# Patient Record
(demographics unavailable — no encounter records)

---

## 2025-04-23 NOTE — WORK
[Other: ___] : [unfilled] [Was the competent medical cause of the injury] : was the competent medical cause of the injury [Consistent with my objective findings] : consistent with my objective findings [Total (100%)] : total (100%) [Cannot return to work because ________] : cannot return to work because [unfilled] [No Rx restrictions] : No Rx restrictions. [I provided the services listed above] :  I provided the services listed above.

## 2025-04-24 NOTE — PHYSICAL EXAM
[Bilateral] : shoulder bilaterally [Sitting] : sitting [Mild] : mild [5 ___] : forward flexion 5[unfilled]/5 [5___] : external rotation 5[unfilled]/5 [] : motor and sensory intact distally [TWNoteComboBox4] : passive forward flexion 160 degrees [TWNoteComboBox6] : internal rotation L1

## 2025-04-24 NOTE — DISCUSSION/SUMMARY
[Medication Risks Reviewed] : Medication risks reviewed [de-identified] : cervical radiculopathy and bilateral shoulder impingement fibromyalgia has found relief in the past with PT - will continue this MRI C-spine indicated not working f/u 6 weeks

## 2025-04-24 NOTE — HISTORY OF PRESENT ILLNESS
[Neck] : neck [6] : 6 [Localized] : localized [Tightness] : tightness [Intermittent] : intermittent [Nothing helps with pain getting better] : Nothing helps with pain getting better [de-identified] : WC DOI 9/13/2013  Prior notes 6/3/15: here as a new consult from Dr. Govea - per his notes  " 51yo [employer] F reports R shoulder pain x 2 yrs. 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 Had PT ordered by pain management (Dr. Schmid) with some relief initially but now recurred. Also reports neck pain for the same duration also treated with PT. Neck stable at this point. INteremittent hand numbness based on sleeping position.  10/2/2013 MRI C-spine ([facility]): C2-3, 3-4. 4-5 herniation, no cord signal abnormality  4/1/15 MRI R shoulder ([facility]): Undersurface spurring acromion with subacromial bursitis, Moderate tendinosis SS, IS and bursal sided fraying.   4/6/15: Shoulder improving with PT. Presents to review MRI.  5/21/15: Several questions today. Concerned with hand, axilla, bilat shoulders. Thought she had breast cancer so had eval for lymphadenopathy of bilat axilla recently. She tells me this may become a workers comp case but she is still working this out with her .  Here today as consult with pain in the right posterior shoulder/neck area - left arm diffusely week - was given a prescription for Mobic which she cant take as it spikes her blood pressure - left arm/shoulder weak as well and painful  Tried 2 months of PT for her neck - not anymore due to lack of insurance  HTN - no hx of cancer   Works as a  - been out of work for over 1 year at this point - right handed - no loss of fine motor - no loss of b/b control.  Unable to get the C spine MRI disc to open today in the office  6/17/15: here for f/u - plan at last visit was "would like new MRI as she has per report right sided NF stenosis at C5-6 which could correlate to her symptoms - will add Neurontin and have f/u in 3 weeks - left shoulder symptoms as well will get left shoulder MRI - cont with PT" - overall doing the same - been going to PT - helps a bit but not able to go much - tried heating which was helpful - at times down the arms right arm is worse than the left - moves around - didn't take the gabapentin - now with pain in the left hand.   MRI L shoulder - mild distal SS tendinosis with mild undersurgace fraying of tendon fibers no high grade or full thinckness. Type 3 slap tear  MRI C spine - Multilvl degen changes - most pronnced from C3-4 through C5-6 - worse at C5-6 with bilateral NF stenosis moderate to severe  7/15/15: here for f/u neck/shoulder - plan last was "Cont with PT at this point - and transition over to HEP" - overall doing reasonably well with the neck and the left shoulder - the right shoulder bothers her though - doing HEP and getting massage at home - motrin helping currently - didn't get gabapentin filled (it didn't go through)  10/14/15: here for f/u - plan at last was "Cont with PT if possible - if not then would cont with HEP - we discussed shoulder injectin shes not interested at this point - will add gabapentin paper script" - overall at this point the neck pain is better but not the shoulder - PT was only approved twice at this point - she was approved for workers comp -  11/25/15: Here for f/u - plan at last was "Suspect that she could work in a very flexible capacity - though do not know if she could rigid sustained she would need to have control over the schedule in order to rest according to the level of pain that she was experiencing - will add Lidoderm patches as well - do thinks that PT would be beneficial in terms of reducing pain, increasing functional capacity and possibly in a work hardening capacity - would like to see PT approved at this point and centered around the right shoulder girdle and proximal arm - we did discuss possibly of referral to pain management for MELI" - overall doing poorly - signficnt pain in the right neck/shoulder/trapezius  Has lumps in the right forearm  1/13/16: Here for f/u - plan at last was "would refer over to pain for MELI if that fails then facet block versus ACDF c5-6 as other options - think she would benefit from PT at this point in terms decreasing pain in increasing functional capacity - would order MRI of the right forearm likely lipoma"   MRI forearm - Skin marker is evident along the ulnar aspect proximal one third of the forearm. Skin marker is also evident along the ulnar aspect of the wrist without abnormal signal deep to the skin marker around the skin marker to suggest pathology. There is no abnormal signal evident deep to or around the skin marker. There is no abnormal signal evident throughout the osseous structures of the imaged forearm to suggest fracture or osteonecrosis. The visualized musculature of the imaged form appears grossly normal in signal and morphology. Incidental note is made of high signal foci within the ulnar slightly volar mid one third subcutaneous tissue of the forearm suggesting venous varicosities. If pain is localized to these regions then correlation with ultrasound is suggested as clinically warranted. The musculature of the imaged forearm appears grossly normal in signal and morphology. The fat planes surrounding the visualized neurovascular structures are preserved  has seen a dermatologist for the forearm  PT has been approved and also the MELI - no MELI  2/10/16: Follows up today. Plan at last visit was -"would refer over to pain for MELI if that fails then facet block versus ACDF c5-6 as other options - think she would benefit from PT at this point in terms decreasing pain in increasing functional capacity - the right arm lesion is being followed by dermatology currently - new PT script - Tylenol as needed - working in very limited capacity currently." Overall doing about the same. States that PT has been helping and taking Tylenol prn. She has stopped working. She did not proceed with epidural since she would like to continue with PT and Tylenol and utilize MELI if pain worsens. She has been noticing pain throughout her body toward the end of the day.  3/9/16: Follows up today. Plan at last visit was "would refer over to pain for MELI if that fails then facet block versus ACDF c5-6 as other options - think she would benefit from PT at this point in terms decreasing pain in increasing functional capacity - the right arm lesion is being followed by dermatology currently - new PT script - Tylenol as needed - OOW and applying for disability at this time. We also recommend going for labs - CBC with diff, HLA-B27, RA, ESR, CRP. Follow up 4 weeks." Overall doing alittle bit better. States that she has been doing PT/HEP. Since last visit she has been having a sharp upper quadrant pain which she has been following with her PCP who believes it is a muscle spasm. She presents today with her lab results requested at last visit. States that she has not been to dermatology to evaluate lipoma on right arm. Currently OOW.  Results: unremarkable  4/20/16: here for fu - plan at last was "Continue PT/HEP - Continue to consider MELI if pain gets unbearable - Consult with dermatology for lipoma evaluation - Continue OOW" - overall doing about the same - still significant pain in the neck and in the bilateral shoulders and down the right arm - not using pain killer at this point - using heating pad as well  6/1/16: Here for f/u - plan at last was "Continue PT/HEP - Continue to consider MELI if pain gets unbearable - Continue OOW - fu 4 weeks" - out of work at this point - pain in the neck and the right shoulder - using motrin at times  7/13/16: Here for f/u - plan at last was "Cont with HEP - motrin is helpful - Lidoderm patches - discussed referral to pain for cervical injectin but shes not ready for that at this point" - HEP/PT is helpful for her - overall doing about the same - still with significant pain in the right trap/shoulder - using motrin at this point - continued out of work at this point  9/7/16: Follows up today. Plan at last visit was -"Cont with HEP - will cont with PT - motrin is helpful - Lidoderm patches - discussed referral to pain for cervical injectin but shes not ready for that at this point." Overall doing about the same if not worse. States that she has not had PT x 20 days since they did not approve it. She is thinking about using her private insurance at this point. Continues oow.  10/19/16: f/u today, plant last visit was -"Cont with HEP - resubmit auth for PT which has been deemed medically necessary at this point for performing activities of daily living- motrin is helpful - Lidoderm patches - discussed referral to pain for cervical injection and she feels ready for that at this point - follow up 4 - 6 weeks - at future visit may consider right shoulder injection." She is overall doing about the same, she bought a massage chair and has been using hot compresses with relief. The Lidoderm patches are very helpful. PT is still not approved. She is not interested in a MELI or right shoulder injection at this point but will consider it next visit.  12/14/2016: Plan last visit "Cont with HEP - resubmit auth for PT which has been deemed medically necessary at this point for performing activities of daily living Lidoderm patches - up 4 - 6 weeks - at future visit may consider right shoulder injection." Sx unchanged. PT was not approved.  1/25/17: HERe for f/u - plan at last was "R subacromial bursa injected with 1 cc kenalog 10 2 cc Marcaine 2 cc lido under sterile technique - PT requested, again" - no help from the injection in the right shoulder - has been using meds/hot towel - posterior/neck shoulder is very tight - PT not approved via  - the Lidoderm patches are also not covered   xrays today: C spine 4 views - loss of disc height at 5-6 and anterior calcification at C6-7 - slight retrolisthesis of C5 on C6 T spine 2 views - spondylosis noted R shoulder 3 views - no fractures  2/22/17: Here for f/u - plan at last visit "Would obtain updated MRI of the C spine and the R shoulder - referral to pain for MELI - referral to rheumatology for further eval of polyarthralgia - cont with Lidoderm patches - not able to return to work due to continued pain - would refer back to MELI - if that fails would be a candidate for surgery". States she is having worsening right neck/shoulder pain. Has not seen pain management. Has not rheumatologist. Lidoderm/PT/heat helps.  no longer authorizing PT. Has been doing massage therapy with relief. Shoulder injections haven't been helpful in the past - most of the pain in the back of the neck - using Lidoderm patches   MRI cspine: 1. Straightening with reversal of cervical lordosis. 2. Posterior central disc herniations at C2/3, C3/4, C4/5 and C5/6, with superior disc extrusion at C3/4. 3. Annular tear at C4/5. 4. Broad-based left paracentral/left foraminal disc herniation, arthrosis, central canal stenosis and bilateral neuroforaminal narrowing, left more than right, at C5/6.  MRI right shoulder: 1. Severe supraspinatus tendinosis with prominent bursal-sided frayed partial tear and bursal-sided linear oblique partial tear extending to its enthesis without full-thickness, retracted tear. 2. Prominent subdeltoid bursitis. 3. Anatomic findings suggesting subacromial impingement in the correct clinical setting.  5/17/17: Here for f/u - plan at last was "Do not think she is at [facility] would want her to - referral to pain for MELI - after that would consider her at [facility] - referral to rheumatology for further eval of polyarthralgia - cont with Lidoderm patches - not able to return to work due to continued pain -would refer back to MELI - if that fails would be a candidate for surgery - referral to shoulder surgeon for further eval of the shoulder and for permanency evaluation of the shoulder and possible other treatment options - more massage therapy and refilled the Lidoderm patches" - overall doing worse - pain in the neck and into the right shoulder - PT not approved  Has seen Dr Govea and has [facility] done for the shoulder   Saw rheumtatology and was diagnosed with fibromyalgia  Has done PT and medications  Notes that the pain in the shoulder limits her activity to only a few hours of activity a month   right handed   Can drive short distances - lives with family - has stairs at home and can climb the stairs - able to bathe/dress herself - can do simple household chores slowly   Pain worse in the shoulder than in the neck - the pain goes at time all the way down the arm  6/28/17: Here for f/u - plan at last "NSL-PPD done for the c spine today - see notes for details - fu 6 weeks - lidoerm patches refilled" Overall doing worse - pain in the neck and into the right shoulder - PT not approved so has been doing it under her own insurance which has been improving her symptoms - has not seen pain management, no ESIs - OOW - has not been following rheumatologist  8/9/17: here for f/u - plan at last was "Will again refer to pain management for MELI - f/u with Rheumatologist for fibromyalgia management - cont with Lidoderm patches - not able to return to work due to continued pain - if that CESIs fails would be a candidate for surgery - cont. PT as this has been helpful - has Lidoderm patches at home - fu 6 weeks". Overall doing about the same. Scheudled for MBB with Dr. Diaz Aug 23rd. OOW. L shoulder more painful for her - doing PT which is helpful - limited ROM  9/20/17: F/u today - plan from last visit was "Cervical MBB scheduled 8/23 with Dr. Diaz - f/u with Rheumatologist for fibromyalgia management - cont with Lidoderm patches - not able to return to work due to continued pain - if that CESIs fails would be a candidate for surgery - cont. PT as this has been helpful - has Lidoderm patches at home - fu 6 weeks" - overall symptoms continue - had cervical MBB with Dr. Diaz without relief - Dr. Diaz requested MELI - she has been doing PT - has been doing light duty, but states have difficulty lifting even up to 10 lbs and cant lift the 10 pound s ever under any circumstances  --------------------------------------------------------------------------------------------------------------------  4/23/2025: Michelle is a 60 year old female presenting with neck pain that goes into bilateral shoulders.  There is tightness to the neck radiating into the upper back with burning.  No radiating arm pain or tingling. No loss of fine motor motions. Bilateral anterior shoulder pain. Has been seeing Dr Honeycutt and has been attending PT - Has not had PT since 8/2024 but has been doing home exercises Has not taken any medications for pain  No recent injections no working  xrays today C-spine 4v - cervical spondylosis  [] : no [FreeTextEntry1] : [place] shoulders  [FreeTextEntry3] :  09/03/13 [FreeTextEntry7] : Bilateral shoulders [de-identified] : Dr. Honeycutt [de-identified] : 04/16/2024 [de-identified] : XRAY of cspine from 4/2024

## 2025-04-24 NOTE — DISCUSSION/SUMMARY
[Medication Risks Reviewed] : Medication risks reviewed [de-identified] : cervical radiculopathy and bilateral shoulder impingement fibromyalgia has found relief in the past with PT - will continue this MRI C-spine indicated not working f/u 6 weeks

## 2025-04-24 NOTE — HISTORY OF PRESENT ILLNESS
[Neck] : neck [6] : 6 [Localized] : localized [Tightness] : tightness [Intermittent] : intermittent [Nothing helps with pain getting better] : Nothing helps with pain getting better [de-identified] : WC DOI 9/13/2013  Prior notes 6/3/15: here as a new consult from Dr. Govea - per his notes  " 51yo [employer] F reports R shoulder pain x 2 yrs. 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 Had PT ordered by pain management (Dr. Schmid) with some relief initially but now recurred. Also reports neck pain for the same duration also treated with PT. Neck stable at this point. INteremittent hand numbness based on sleeping position.  10/2/2013 MRI C-spine ([facility]): C2-3, 3-4. 4-5 herniation, no cord signal abnormality  4/1/15 MRI R shoulder ([facility]): Undersurface spurring acromion with subacromial bursitis, Moderate tendinosis SS, IS and bursal sided fraying.   4/6/15: Shoulder improving with PT. Presents to review MRI.  5/21/15: Several questions today. Concerned with hand, axilla, bilat shoulders. Thought she had breast cancer so had eval for lymphadenopathy of bilat axilla recently. She tells me this may become a workers comp case but she is still working this out with her .  Here today as consult with pain in the right posterior shoulder/neck area - left arm diffusely week - was given a prescription for Mobic which she cant take as it spikes her blood pressure - left arm/shoulder weak as well and painful  Tried 2 months of PT for her neck - not anymore due to lack of insurance  HTN - no hx of cancer   Works as a  - been out of work for over 1 year at this point - right handed - no loss of fine motor - no loss of b/b control.  Unable to get the C spine MRI disc to open today in the office  6/17/15: here for f/u - plan at last visit was "would like new MRI as she has per report right sided NF stenosis at C5-6 which could correlate to her symptoms - will add Neurontin and have f/u in 3 weeks - left shoulder symptoms as well will get left shoulder MRI - cont with PT" - overall doing the same - been going to PT - helps a bit but not able to go much - tried heating which was helpful - at times down the arms right arm is worse than the left - moves around - didn't take the gabapentin - now with pain in the left hand.   MRI L shoulder - mild distal SS tendinosis with mild undersurgace fraying of tendon fibers no high grade or full thinckness. Type 3 slap tear  MRI C spine - Multilvl degen changes - most pronnced from C3-4 through C5-6 - worse at C5-6 with bilateral NF stenosis moderate to severe  7/15/15: here for f/u neck/shoulder - plan last was "Cont with PT at this point - and transition over to HEP" - overall doing reasonably well with the neck and the left shoulder - the right shoulder bothers her though - doing HEP and getting massage at home - motrin helping currently - didn't get gabapentin filled (it didn't go through)  10/14/15: here for f/u - plan at last was "Cont with PT if possible - if not then would cont with HEP - we discussed shoulder injectin shes not interested at this point - will add gabapentin paper script" - overall at this point the neck pain is better but not the shoulder - PT was only approved twice at this point - she was approved for workers comp -  11/25/15: Here for f/u - plan at last was "Suspect that she could work in a very flexible capacity - though do not know if she could rigid sustained she would need to have control over the schedule in order to rest according to the level of pain that she was experiencing - will add Lidoderm patches as well - do thinks that PT would be beneficial in terms of reducing pain, increasing functional capacity and possibly in a work hardening capacity - would like to see PT approved at this point and centered around the right shoulder girdle and proximal arm - we did discuss possibly of referral to pain management for MELI" - overall doing poorly - signficnt pain in the right neck/shoulder/trapezius  Has lumps in the right forearm  1/13/16: Here for f/u - plan at last was "would refer over to pain for MELI if that fails then facet block versus ACDF c5-6 as other options - think she would benefit from PT at this point in terms decreasing pain in increasing functional capacity - would order MRI of the right forearm likely lipoma"   MRI forearm - Skin marker is evident along the ulnar aspect proximal one third of the forearm. Skin marker is also evident along the ulnar aspect of the wrist without abnormal signal deep to the skin marker around the skin marker to suggest pathology. There is no abnormal signal evident deep to or around the skin marker. There is no abnormal signal evident throughout the osseous structures of the imaged forearm to suggest fracture or osteonecrosis. The visualized musculature of the imaged form appears grossly normal in signal and morphology. Incidental note is made of high signal foci within the ulnar slightly volar mid one third subcutaneous tissue of the forearm suggesting venous varicosities. If pain is localized to these regions then correlation with ultrasound is suggested as clinically warranted. The musculature of the imaged forearm appears grossly normal in signal and morphology. The fat planes surrounding the visualized neurovascular structures are preserved  has seen a dermatologist for the forearm  PT has been approved and also the MELI - no MELI  2/10/16: Follows up today. Plan at last visit was -"would refer over to pain for MELI if that fails then facet block versus ACDF c5-6 as other options - think she would benefit from PT at this point in terms decreasing pain in increasing functional capacity - the right arm lesion is being followed by dermatology currently - new PT script - Tylenol as needed - working in very limited capacity currently." Overall doing about the same. States that PT has been helping and taking Tylenol prn. She has stopped working. She did not proceed with epidural since she would like to continue with PT and Tylenol and utilize MELI if pain worsens. She has been noticing pain throughout her body toward the end of the day.  3/9/16: Follows up today. Plan at last visit was "would refer over to pain for MELI if that fails then facet block versus ACDF c5-6 as other options - think she would benefit from PT at this point in terms decreasing pain in increasing functional capacity - the right arm lesion is being followed by dermatology currently - new PT script - Tylenol as needed - OOW and applying for disability at this time. We also recommend going for labs - CBC with diff, HLA-B27, RA, ESR, CRP. Follow up 4 weeks." Overall doing alittle bit better. States that she has been doing PT/HEP. Since last visit she has been having a sharp upper quadrant pain which she has been following with her PCP who believes it is a muscle spasm. She presents today with her lab results requested at last visit. States that she has not been to dermatology to evaluate lipoma on right arm. Currently OOW.  Results: unremarkable  4/20/16: here for fu - plan at last was "Continue PT/HEP - Continue to consider MELI if pain gets unbearable - Consult with dermatology for lipoma evaluation - Continue OOW" - overall doing about the same - still significant pain in the neck and in the bilateral shoulders and down the right arm - not using pain killer at this point - using heating pad as well  6/1/16: Here for f/u - plan at last was "Continue PT/HEP - Continue to consider MELI if pain gets unbearable - Continue OOW - fu 4 weeks" - out of work at this point - pain in the neck and the right shoulder - using motrin at times  7/13/16: Here for f/u - plan at last was "Cont with HEP - motrin is helpful - Lidoderm patches - discussed referral to pain for cervical injectin but shes not ready for that at this point" - HEP/PT is helpful for her - overall doing about the same - still with significant pain in the right trap/shoulder - using motrin at this point - continued out of work at this point  9/7/16: Follows up today. Plan at last visit was -"Cont with HEP - will cont with PT - motrin is helpful - Lidoderm patches - discussed referral to pain for cervical injectin but shes not ready for that at this point." Overall doing about the same if not worse. States that she has not had PT x 20 days since they did not approve it. She is thinking about using her private insurance at this point. Continues oow.  10/19/16: f/u today, plant last visit was -"Cont with HEP - resubmit auth for PT which has been deemed medically necessary at this point for performing activities of daily living- motrin is helpful - Lidoderm patches - discussed referral to pain for cervical injection and she feels ready for that at this point - follow up 4 - 6 weeks - at future visit may consider right shoulder injection." She is overall doing about the same, she bought a massage chair and has been using hot compresses with relief. The Lidoderm patches are very helpful. PT is still not approved. She is not interested in a MELI or right shoulder injection at this point but will consider it next visit.  12/14/2016: Plan last visit "Cont with HEP - resubmit auth for PT which has been deemed medically necessary at this point for performing activities of daily living Lidoderm patches - up 4 - 6 weeks - at future visit may consider right shoulder injection." Sx unchanged. PT was not approved.  1/25/17: HERe for f/u - plan at last was "R subacromial bursa injected with 1 cc kenalog 10 2 cc Marcaine 2 cc lido under sterile technique - PT requested, again" - no help from the injection in the right shoulder - has been using meds/hot towel - posterior/neck shoulder is very tight - PT not approved via  - the Lidoderm patches are also not covered   xrays today: C spine 4 views - loss of disc height at 5-6 and anterior calcification at C6-7 - slight retrolisthesis of C5 on C6 T spine 2 views - spondylosis noted R shoulder 3 views - no fractures  2/22/17: Here for f/u - plan at last visit "Would obtain updated MRI of the C spine and the R shoulder - referral to pain for MELI - referral to rheumatology for further eval of polyarthralgia - cont with Lidoderm patches - not able to return to work due to continued pain - would refer back to MELI - if that fails would be a candidate for surgery". States she is having worsening right neck/shoulder pain. Has not seen pain management. Has not rheumatologist. Lidoderm/PT/heat helps.  no longer authorizing PT. Has been doing massage therapy with relief. Shoulder injections haven't been helpful in the past - most of the pain in the back of the neck - using Lidoderm patches   MRI cspine: 1. Straightening with reversal of cervical lordosis. 2. Posterior central disc herniations at C2/3, C3/4, C4/5 and C5/6, with superior disc extrusion at C3/4. 3. Annular tear at C4/5. 4. Broad-based left paracentral/left foraminal disc herniation, arthrosis, central canal stenosis and bilateral neuroforaminal narrowing, left more than right, at C5/6.  MRI right shoulder: 1. Severe supraspinatus tendinosis with prominent bursal-sided frayed partial tear and bursal-sided linear oblique partial tear extending to its enthesis without full-thickness, retracted tear. 2. Prominent subdeltoid bursitis. 3. Anatomic findings suggesting subacromial impingement in the correct clinical setting.  5/17/17: Here for f/u - plan at last was "Do not think she is at [facility] would want her to - referral to pain for MELI - after that would consider her at [facility] - referral to rheumatology for further eval of polyarthralgia - cont with Lidoderm patches - not able to return to work due to continued pain -would refer back to MELI - if that fails would be a candidate for surgery - referral to shoulder surgeon for further eval of the shoulder and for permanency evaluation of the shoulder and possible other treatment options - more massage therapy and refilled the Lidoderm patches" - overall doing worse - pain in the neck and into the right shoulder - PT not approved  Has seen Dr Govea and has [facility] done for the shoulder   Saw rheumtatology and was diagnosed with fibromyalgia  Has done PT and medications  Notes that the pain in the shoulder limits her activity to only a few hours of activity a month   right handed   Can drive short distances - lives with family - has stairs at home and can climb the stairs - able to bathe/dress herself - can do simple household chores slowly   Pain worse in the shoulder than in the neck - the pain goes at time all the way down the arm  6/28/17: Here for f/u - plan at last "NSL-PPD done for the c spine today - see notes for details - fu 6 weeks - lidoerm patches refilled" Overall doing worse - pain in the neck and into the right shoulder - PT not approved so has been doing it under her own insurance which has been improving her symptoms - has not seen pain management, no ESIs - OOW - has not been following rheumatologist  8/9/17: here for f/u - plan at last was "Will again refer to pain management for MELI - f/u with Rheumatologist for fibromyalgia management - cont with Lidoderm patches - not able to return to work due to continued pain - if that CESIs fails would be a candidate for surgery - cont. PT as this has been helpful - has Lidoderm patches at home - fu 6 weeks". Overall doing about the same. Scheudled for MBB with Dr. Diaz Aug 23rd. OOW. L shoulder more painful for her - doing PT which is helpful - limited ROM  9/20/17: F/u today - plan from last visit was "Cervical MBB scheduled 8/23 with Dr. Diaz - f/u with Rheumatologist for fibromyalgia management - cont with Lidoderm patches - not able to return to work due to continued pain - if that CESIs fails would be a candidate for surgery - cont. PT as this has been helpful - has Lidoderm patches at home - fu 6 weeks" - overall symptoms continue - had cervical MBB with Dr. Diaz without relief - Dr. Diaz requested MELI - she has been doing PT - has been doing light duty, but states have difficulty lifting even up to 10 lbs and cant lift the 10 pound s ever under any circumstances  --------------------------------------------------------------------------------------------------------------------  4/23/2025: Michelle is a 60 year old female presenting with neck pain that goes into bilateral shoulders.  There is tightness to the neck radiating into the upper back with burning.  No radiating arm pain or tingling. No loss of fine motor motions. Bilateral anterior shoulder pain. Has been seeing Dr Honeycutt and has been attending PT - Has not had PT since 8/2024 but has been doing home exercises Has not taken any medications for pain  No recent injections no working  xrays today C-spine 4v - cervical spondylosis  [] : no [FreeTextEntry1] : [place] shoulders  [FreeTextEntry3] :  09/03/13 [FreeTextEntry7] : Bilateral shoulders [de-identified] : 04/16/2024 [de-identified] : Dr. Honeycutt [de-identified] : XRAY of cspine from 4/2024

## 2025-06-18 NOTE — HISTORY OF PRESENT ILLNESS
[Neck] : neck [6] : 6 [Localized] : localized [Tightness] : tightness [Intermittent] : intermittent [Nothing helps with pain getting better] : Nothing helps with pain getting better [de-identified] : WC DOI 9/13/2013  Prior notes 6/3/15: here as a new consult from Dr. Govea - per his notes  " 51yo [employer] F reports R shoulder pain x 2 yrs. 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 Had PT ordered by pain management (Dr. Schmid) with some relief initially but now recurred. Also reports neck pain for the same duration also treated with PT. Neck stable at this point. INteremittent hand numbness based on sleeping position.  10/2/2013 MRI C-spine ([facility]): C2-3, 3-4. 4-5 herniation, no cord signal abnormality  4/1/15 MRI R shoulder ([facility]): Undersurface spurring acromion with subacromial bursitis, Moderate tendinosis SS, IS and bursal sided fraying.   4/6/15: Shoulder improving with PT. Presents to review MRI.  5/21/15: Several questions today. Concerned with hand, axilla, bilat shoulders. Thought she had breast cancer so had eval for lymphadenopathy of bilat axilla recently. She tells me this may become a workers comp case but she is still working this out with her .  Here today as consult with pain in the right posterior shoulder/neck area - left arm diffusely week - was given a prescription for Mobic which she cant take as it spikes her blood pressure - left arm/shoulder weak as well and painful  Tried 2 months of PT for her neck - not anymore due to lack of insurance  HTN - no hx of cancer   Works as a  - been out of work for over 1 year at this point - right handed - no loss of fine motor - no loss of b/b control.  Unable to get the C spine MRI disc to open today in the office  6/17/15: here for f/u - plan at last visit was "would like new MRI as she has per report right sided NF stenosis at C5-6 which could correlate to her symptoms - will add Neurontin and have f/u in 3 weeks - left shoulder symptoms as well will get left shoulder MRI - cont with PT" - overall doing the same - been going to PT - helps a bit but not able to go much - tried heating which was helpful - at times down the arms right arm is worse than the left - moves around - didn't take the gabapentin - now with pain in the left hand.   MRI L shoulder - mild distal SS tendinosis with mild undersurgace fraying of tendon fibers no high grade or full thinckness. Type 3 slap tear  MRI C spine - Multilvl degen changes - most pronnced from C3-4 through C5-6 - worse at C5-6 with bilateral NF stenosis moderate to severe  7/15/15: here for f/u neck/shoulder - plan last was "Cont with PT at this point - and transition over to HEP" - overall doing reasonably well with the neck and the left shoulder - the right shoulder bothers her though - doing HEP and getting massage at home - motrin helping currently - didn't get gabapentin filled (it didn't go through)  10/14/15: here for f/u - plan at last was "Cont with PT if possible - if not then would cont with HEP - we discussed shoulder injectin shes not interested at this point - will add gabapentin paper script" - overall at this point the neck pain is better but not the shoulder - PT was only approved twice at this point - she was approved for workers comp -  11/25/15: Here for f/u - plan at last was "Suspect that she could work in a very flexible capacity - though do not know if she could rigid sustained she would need to have control over the schedule in order to rest according to the level of pain that she was experiencing - will add Lidoderm patches as well - do thinks that PT would be beneficial in terms of reducing pain, increasing functional capacity and possibly in a work hardening capacity - would like to see PT approved at this point and centered around the right shoulder girdle and proximal arm - we did discuss possibly of referral to pain management for MELI" - overall doing poorly - signficnt pain in the right neck/shoulder/trapezius  Has lumps in the right forearm  1/13/16: Here for f/u - plan at last was "would refer over to pain for MELI if that fails then facet block versus ACDF c5-6 as other options - think she would benefit from PT at this point in terms decreasing pain in increasing functional capacity - would order MRI of the right forearm likely lipoma"   MRI forearm - Skin marker is evident along the ulnar aspect proximal one third of the forearm. Skin marker is also evident along the ulnar aspect of the wrist without abnormal signal deep to the skin marker around the skin marker to suggest pathology. There is no abnormal signal evident deep to or around the skin marker. There is no abnormal signal evident throughout the osseous structures of the imaged forearm to suggest fracture or osteonecrosis. The visualized musculature of the imaged form appears grossly normal in signal and morphology. Incidental note is made of high signal foci within the ulnar slightly volar mid one third subcutaneous tissue of the forearm suggesting venous varicosities. If pain is localized to these regions then correlation with ultrasound is suggested as clinically warranted. The musculature of the imaged forearm appears grossly normal in signal and morphology. The fat planes surrounding the visualized neurovascular structures are preserved  has seen a dermatologist for the forearm  PT has been approved and also the MELI - no MELI  2/10/16: Follows up today. Plan at last visit was -"would refer over to pain for MELI if that fails then facet block versus ACDF c5-6 as other options - think she would benefit from PT at this point in terms decreasing pain in increasing functional capacity - the right arm lesion is being followed by dermatology currently - new PT script - Tylenol as needed - working in very limited capacity currently." Overall doing about the same. States that PT has been helping and taking Tylenol prn. She has stopped working. She did not proceed with epidural since she would like to continue with PT and Tylenol and utilize MELI if pain worsens. She has been noticing pain throughout her body toward the end of the day.  3/9/16: Follows up today. Plan at last visit was "would refer over to pain for MELI if that fails then facet block versus ACDF c5-6 as other options - think she would benefit from PT at this point in terms decreasing pain in increasing functional capacity - the right arm lesion is being followed by dermatology currently - new PT script - Tylenol as needed - OOW and applying for disability at this time. We also recommend going for labs - CBC with diff, HLA-B27, RA, ESR, CRP. Follow up 4 weeks." Overall doing alittle bit better. States that she has been doing PT/HEP. Since last visit she has been having a sharp upper quadrant pain which she has been following with her PCP who believes it is a muscle spasm. She presents today with her lab results requested at last visit. States that she has not been to dermatology to evaluate lipoma on right arm. Currently OOW.  Results: unremarkable  4/20/16: here for fu - plan at last was "Continue PT/HEP - Continue to consider MELI if pain gets unbearable - Consult with dermatology for lipoma evaluation - Continue OOW" - overall doing about the same - still significant pain in the neck and in the bilateral shoulders and down the right arm - not using pain killer at this point - using heating pad as well  6/1/16: Here for f/u - plan at last was "Continue PT/HEP - Continue to consider MELI if pain gets unbearable - Continue OOW - fu 4 weeks" - out of work at this point - pain in the neck and the right shoulder - using motrin at times  7/13/16: Here for f/u - plan at last was "Cont with HEP - motrin is helpful - Lidoderm patches - discussed referral to pain for cervical injectin but shes not ready for that at this point" - HEP/PT is helpful for her - overall doing about the same - still with significant pain in the right trap/shoulder - using motrin at this point - continued out of work at this point  9/7/16: Follows up today. Plan at last visit was -"Cont with HEP - will cont with PT - motrin is helpful - Lidoderm patches - discussed referral to pain for cervical injectin but shes not ready for that at this point." Overall doing about the same if not worse. States that she has not had PT x 20 days since they did not approve it. She is thinking about using her private insurance at this point. Continues oow.  10/19/16: f/u today, plant last visit was -"Cont with HEP - resubmit auth for PT which has been deemed medically necessary at this point for performing activities of daily living- motrin is helpful - Lidoderm patches - discussed referral to pain for cervical injection and she feels ready for that at this point - follow up 4 - 6 weeks - at future visit may consider right shoulder injection." She is overall doing about the same, she bought a massage chair and has been using hot compresses with relief. The Lidoderm patches are very helpful. PT is still not approved. She is not interested in a MELI or right shoulder injection at this point but will consider it next visit.  12/14/2016: Plan last visit "Cont with HEP - resubmit auth for PT which has been deemed medically necessary at this point for performing activities of daily living Lidoderm patches - up 4 - 6 weeks - at future visit may consider right shoulder injection." Sx unchanged. PT was not approved.  1/25/17: HERe for f/u - plan at last was "R subacromial bursa injected with 1 cc kenalog 10 2 cc Marcaine 2 cc lido under sterile technique - PT requested, again" - no help from the injection in the right shoulder - has been using meds/hot towel - posterior/neck shoulder is very tight - PT not approved via  - the Lidoderm patches are also not covered   xrays today: C spine 4 views - loss of disc height at 5-6 and anterior calcification at C6-7 - slight retrolisthesis of C5 on C6 T spine 2 views - spondylosis noted R shoulder 3 views - no fractures  2/22/17: Here for f/u - plan at last visit "Would obtain updated MRI of the C spine and the R shoulder - referral to pain for MELI - referral to rheumatology for further eval of polyarthralgia - cont with Lidoderm patches - not able to return to work due to continued pain - would refer back to MELI - if that fails would be a candidate for surgery". States she is having worsening right neck/shoulder pain. Has not seen pain management. Has not rheumatologist. Lidoderm/PT/heat helps.  no longer authorizing PT. Has been doing massage therapy with relief. Shoulder injections haven't been helpful in the past - most of the pain in the back of the neck - using Lidoderm patches   MRI cspine: 1. Straightening with reversal of cervical lordosis. 2. Posterior central disc herniations at C2/3, C3/4, C4/5 and C5/6, with superior disc extrusion at C3/4. 3. Annular tear at C4/5. 4. Broad-based left paracentral/left foraminal disc herniation, arthrosis, central canal stenosis and bilateral neuroforaminal narrowing, left more than right, at C5/6.  MRI right shoulder: 1. Severe supraspinatus tendinosis with prominent bursal-sided frayed partial tear and bursal-sided linear oblique partial tear extending to its enthesis without full-thickness, retracted tear. 2. Prominent subdeltoid bursitis. 3. Anatomic findings suggesting subacromial impingement in the correct clinical setting.  5/17/17: Here for f/u - plan at last was "Do not think she is at [facility] would want her to - referral to pain for MELI - after that would consider her at [facility] - referral to rheumatology for further eval of polyarthralgia - cont with Lidoderm patches - not able to return to work due to continued pain -would refer back to MELI - if that fails would be a candidate for surgery - referral to shoulder surgeon for further eval of the shoulder and for permanency evaluation of the shoulder and possible other treatment options - more massage therapy and refilled the Lidoderm patches" - overall doing worse - pain in the neck and into the right shoulder - PT not approved  Has seen Dr Govea and has [facility] done for the shoulder   Saw rheumtatology and was diagnosed with fibromyalgia  Has done PT and medications  Notes that the pain in the shoulder limits her activity to only a few hours of activity a month   right handed   Can drive short distances - lives with family - has stairs at home and can climb the stairs - able to bathe/dress herself - can do simple household chores slowly   Pain worse in the shoulder than in the neck - the pain goes at time all the way down the arm  6/28/17: Here for f/u - plan at last "NSL-PPD done for the c spine today - see notes for details - fu 6 weeks - lidoerm patches refilled" Overall doing worse - pain in the neck and into the right shoulder - PT not approved so has been doing it under her own insurance which has been improving her symptoms - has not seen pain management, no ESIs - OOW - has not been following rheumatologist  8/9/17: here for f/u - plan at last was "Will again refer to pain management for MELI - f/u with Rheumatologist for fibromyalgia management - cont with Lidoderm patches - not able to return to work due to continued pain - if that CESIs fails would be a candidate for surgery - cont. PT as this has been helpful - has Lidoderm patches at home - fu 6 weeks". Overall doing about the same. Scheudled for MBB with Dr. Diaz Aug 23rd. OOW. L shoulder more painful for her - doing PT which is helpful - limited ROM  9/20/17: F/u today - plan from last visit was "Cervical MBB scheduled 8/23 with Dr. Diaz - f/u with Rheumatologist for fibromyalgia management - cont with Lidoderm patches - not able to return to work due to continued pain - if that CESIs fails would be a candidate for surgery - cont. PT as this has been helpful - has Lidoderm patches at home - fu 6 weeks" - overall symptoms continue - had cervical MBB with Dr. Diaz without relief - Dr. Diaz requested MELI - she has been doing PT - has been doing light duty, but states have difficulty lifting even up to 10 lbs and cant lift the 10 pound s ever under any circumstances  --------------------------------------------------------------------------------------------------------------------  4/23/2025: Michelle is a 60 year old female presenting with neck pain that goes into bilateral shoulders.  There is tightness to the neck radiating into the upper back with burning.  No radiating arm pain or tingling. No loss of fine motor motions. Bilateral anterior shoulder pain. Has been seeing Dr Honeycutt and has been attending PT - Has not had PT since 8/2024 but has been doing home exercises Has not taken any medications for pain  No recent injections no working  xrays today C-spine 4v - cervical spondylosis   ----------------------  6.18.25 Patient here for neck pain. Here to review MRI  neck pain remains -  NECK PAIN RADIATING TO THE LEFT ARM/TORSO  not taking medication for this   MRi C spine - see report - [place] by my read- spondylosis/stenosis  (we compared to the prior MRI FROM 2018 - no change)  in PT which helps her move a bit better  tried yoga  [] : no [FreeTextEntry1] : [place] shoulders  [FreeTextEntry3] :  09/03/13 [FreeTextEntry7] : Bilateral shoulders [de-identified] : 04/16/2024 [de-identified] : Dr. Honeycutt [de-identified] : XRAY of cspine from 4/2024

## 2025-06-18 NOTE — DISCUSSION/SUMMARY
[Medication Risks Reviewed] : Medication risks reviewed [Surgical risks reviewed] : Surgical risks reviewed [de-identified] : cervical radiculopathy and bilateral shoulder impingement fibromyalgia has found relief in the past with PT - will continue this could consider surgery or injections